# Patient Record
Sex: MALE | Race: WHITE
[De-identification: names, ages, dates, MRNs, and addresses within clinical notes are randomized per-mention and may not be internally consistent; named-entity substitution may affect disease eponyms.]

---

## 2019-09-12 ENCOUNTER — HOSPITAL ENCOUNTER (EMERGENCY)
Dept: HOSPITAL 8 - ED | Age: 34
Discharge: HOME | End: 2019-09-12
Payer: MEDICAID

## 2019-09-12 VITALS — HEIGHT: 72 IN | BODY MASS INDEX: 27.41 KG/M2 | WEIGHT: 202.38 LBS

## 2019-09-12 VITALS — DIASTOLIC BLOOD PRESSURE: 51 MMHG | SYSTOLIC BLOOD PRESSURE: 106 MMHG

## 2019-09-12 DIAGNOSIS — F31.9: ICD-10-CM

## 2019-09-12 DIAGNOSIS — M54.5: Primary | ICD-10-CM

## 2019-09-12 DIAGNOSIS — R11.0: ICD-10-CM

## 2019-09-12 LAB
ANION GAP SERPL CALC-SCNC: 6 MMOL/L (ref 5–15)
BASOPHILS # BLD AUTO: 0.03 X10^3/UL (ref 0–0.1)
BASOPHILS NFR BLD AUTO: 1 % (ref 0–1)
CALCIUM SERPL-MCNC: 8.3 MG/DL (ref 8.5–10.1)
CHLORIDE SERPL-SCNC: 107 MMOL/L (ref 98–107)
CREAT SERPL-MCNC: 1.09 MG/DL (ref 0.7–1.3)
CULTURE INDICATED?: NO
EOSINOPHIL # BLD AUTO: 0.43 X10^3/UL (ref 0–0.4)
EOSINOPHIL NFR BLD AUTO: 6 % (ref 1–7)
ERYTHROCYTE [DISTWIDTH] IN BLOOD BY AUTOMATED COUNT: 14 % (ref 9.4–14.8)
LYMPHOCYTES # BLD AUTO: 2.5 X10^3/UL (ref 1–3.4)
LYMPHOCYTES NFR BLD AUTO: 33 % (ref 22–44)
MCH RBC QN AUTO: 32.3 PG (ref 27.5–34.5)
MCHC RBC AUTO-ENTMCNC: 34.2 G/DL (ref 33.2–36.2)
MCV RBC AUTO: 94.4 FL (ref 81–97)
MD: NO
MICROSCOPIC: (no result)
MONOCYTES # BLD AUTO: 1.03 X10^3/UL (ref 0.2–0.8)
MONOCYTES NFR BLD AUTO: 14 % (ref 2–9)
NEUTROPHILS # BLD AUTO: 3.53 X10^3/UL (ref 1.8–6.8)
NEUTROPHILS NFR BLD AUTO: 47 % (ref 42–75)
PLATELET # BLD AUTO: 229 X10^3/UL (ref 130–400)
PMV BLD AUTO: 8.4 FL (ref 7.4–10.4)
RBC # BLD AUTO: 4.14 X10^6/UL (ref 4.38–5.82)

## 2019-09-12 PROCEDURE — 80048 BASIC METABOLIC PNL TOTAL CA: CPT

## 2019-09-12 PROCEDURE — 81003 URINALYSIS AUTO W/O SCOPE: CPT

## 2019-09-12 PROCEDURE — 36415 COLL VENOUS BLD VENIPUNCTURE: CPT

## 2019-09-12 PROCEDURE — 99283 EMERGENCY DEPT VISIT LOW MDM: CPT

## 2019-09-12 PROCEDURE — 85025 COMPLETE CBC W/AUTO DIFF WBC: CPT

## 2019-09-12 NOTE — NUR
ERP WAS IN FOR RECHECK. D/C INSTRUCTIONS & F/U APPT RV'WD WITH PT, HE 
VERBALIZES UNDERSTANDING. PT AMBULATED OUT OF ED WITHOUT DIFFICULTY.

## 2019-09-12 NOTE — NUR
PT AMBULATED TO BR WITHOUT DIFFICULTY. INSTRUCTED ON CLEAN CATCH URINE SAMPLE. 
MEDICATED FOR PAIN PER ORDERS. RV'WD POC WITH PT.

## 2019-09-26 ENCOUNTER — HOSPITAL ENCOUNTER (EMERGENCY)
Facility: MEDICAL CENTER | Age: 34
End: 2019-09-26
Attending: EMERGENCY MEDICINE
Payer: COMMERCIAL

## 2019-09-26 VITALS
SYSTOLIC BLOOD PRESSURE: 119 MMHG | TEMPERATURE: 97.9 F | HEART RATE: 81 BPM | OXYGEN SATURATION: 93 % | WEIGHT: 212.3 LBS | HEIGHT: 72 IN | RESPIRATION RATE: 14 BRPM | DIASTOLIC BLOOD PRESSURE: 83 MMHG | BODY MASS INDEX: 28.76 KG/M2

## 2019-09-26 DIAGNOSIS — K08.89 TOOTHACHE: ICD-10-CM

## 2019-09-26 PROCEDURE — 99283 EMERGENCY DEPT VISIT LOW MDM: CPT

## 2019-09-26 RX ORDER — IBUPROFEN 600 MG/1
600 TABLET ORAL
Qty: 20 TAB | Refills: 0 | Status: SHIPPED | OUTPATIENT
Start: 2019-09-26 | End: 2020-05-30

## 2019-09-26 RX ORDER — PENICILLIN V POTASSIUM 500 MG/1
500 TABLET ORAL EVERY 6 HOURS
Qty: 28 TAB | Refills: 0 | Status: SHIPPED | OUTPATIENT
Start: 2019-09-26 | End: 2019-10-03

## 2019-09-26 NOTE — DISCHARGE INSTRUCTIONS
Recommend following with dentist using information provided as soon as possible.  You may follow with an oral surgeon, Dr. Medrano, however I would recommend first following with a general dentist.  Return to the ER for increasing pain, swelling, difficulty swallowing or breathing or any turn for the worse.

## 2019-09-26 NOTE — ED PROVIDER NOTES
ED Provider Note    CHIEF COMPLAINT  Chief Complaint   Patient presents with   • Dental Pain       HPI  Hira Noe is a 34 y.o. male who presents complaining of toothache.  It hurts on the bottom right.  It has been hurting for couple months, however it is off and on.  When it is on it tends to last longer and longer now.  It does not hurt at the moment.  He does not have a dentist, and was referred here by the staff in the [facility] where he is staying.  Pain radiates locally.  Rates pain as moderate to severe.  It is worse to chew or bite down.  However, patient is able to take orals.  No associated breathing/swallowing difficulty.  No fever.  No nausea, vomiting, chest pain, shortness of breath, weakness, numbness, bowel or bladder changes. There are no other complaints.    PAST MEDICAL HISTORY  None    FAMILY HISTORY  No family history on file.    SOCIAL HISTORY  He is staying at the [facility]    SURGICAL HISTORY  No past surgical history on file.    CURRENT MEDICATIONS    I have reviewed the nurses notes and/or the list brought with the patient.    ALLERGIES  No Known Allergies    REVIEW OF SYSTEMS  See HPI for further details. Review of systems as above, otherwise all other systems are negative.     PHYSICAL EXAM  VITAL SIGNS: /82   Pulse 81   Temp 36.9 °C (98.4 °F) (Temporal)   Resp 18   Ht 1.829 m (6')   Wt 96.3 kg (212 lb 4.9 oz)   SpO2 97%   BMI 28.79 kg/m²     Constitutional: Well appearing patient in mild distress from pain.  Not toxic, nor ill in appearance.  HENT: Mucus membranes moist.  Oropharynx is clear.  There are scattered caries.  There is no facial edema.  There is no tenderness.  However the tooth of question appears to have a cusp which is decayed, and somewhat discolored; this is his first mandibular molar on the right.  Tongue is normal.  Floor of the mouth is normal.  Submental space is soft.  Posterior pharynx is normal.  Patient is tolerating secretions  without difficulty.  Eyes: Pupils equally round.  No scleral icterus.   Neck: Full nontender range of motion; no meningismus.  Lymphatic: No cervical lymphadenopathy noted.   Cardiovascular: Regular heart rate and rhythm.  No murmurs, rubs, nor gallop appreciated.   Thorax & Lungs: Lungs are clear to auscultation with good air movement bilaterally.  No wheeze, rhonchi, nor rales.   Abdomen: Soft, with no tenderness, rebound nor guarding.  No mass, pulsatile mass, nor hepatosplenomegaly appreciated.  Skin: No purpura nor petechia noted.  Extremities/Musculoskeletal: No sign of trauma.  Musculoskeletal: Range of motion is intact in all major joints.  Neurologic: Alert & oriented.  Strength and sensation is intact all around.  No dysmetria.  Gait is normal.  Psychiatric: Normal affect appropriate for the clinical situation.      MEDICAL RECORD  I have reviewed patient's medical record and pertinent results are listed above.    COURSE & MEDICAL DECISION MAKING  I have reviewed any medical record information, laboratory studies and radiographic results as noted above.  This patient presents with a toothache.  There is no obvious dental abscess at this time which I can drain.  I am prescribing the patient with ibuprofen and antibiotics.  He was given referral for Dr. Medrano and oral surgery, I suspect he would be best served by starting off with general dentistry first.  They are counseled that they may get worse before getting better and to return for increasing pain or swelling, breathing/swallowing difficulty, fever, any other concern at all.  They are given aftercare instructions on toothache, a dental referral sheet, and narcotic cautions.    FINAL IMPRESSION  1. Toothache           This dictation was created using voice recognition software.    Electronically signed by: Chiki Benavides, 9/26/2019 2:09 PM

## 2019-09-26 NOTE — ED NOTES
Pt discharged home. Explained discharge instructions. Questions and comments addressed. Pt verbalized understanding of instructions. Wristband removed. Pt advised to follow-up with dentist asap or return to ED for any new or worsening of symptoms. Pt is ambulating well and steady on feet. VS stable. self will be escorting pt home.     Pt verbalized understanding of medication instructions.

## 2019-10-25 ENCOUNTER — HOSPITAL ENCOUNTER (EMERGENCY)
Dept: HOSPITAL 8 - ED | Age: 34
Discharge: HOME | End: 2019-10-25
Payer: MEDICAID

## 2019-10-25 VITALS — DIASTOLIC BLOOD PRESSURE: 91 MMHG | SYSTOLIC BLOOD PRESSURE: 121 MMHG

## 2019-10-25 VITALS — BODY MASS INDEX: 28.76 KG/M2 | WEIGHT: 212.31 LBS | HEIGHT: 72 IN

## 2019-10-25 DIAGNOSIS — M54.42: Primary | ICD-10-CM

## 2019-10-25 PROCEDURE — 99283 EMERGENCY DEPT VISIT LOW MDM: CPT

## 2019-10-25 PROCEDURE — 72110 X-RAY EXAM L-2 SPINE 4/>VWS: CPT

## 2019-10-25 NOTE — NUR
Patient discharged home, discharge instructions provided. All questions and 
concerns addressed. Ambulatory with steady gait. All patient belongings with 
patient.
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
## 2019-11-25 ENCOUNTER — HOSPITAL ENCOUNTER (EMERGENCY)
Dept: HOSPITAL 8 - ED | Age: 34
Discharge: HOME | End: 2019-11-25
Payer: MEDICAID

## 2019-11-25 VITALS — HEIGHT: 72 IN | BODY MASS INDEX: 29.56 KG/M2 | WEIGHT: 218.26 LBS

## 2019-11-25 VITALS — DIASTOLIC BLOOD PRESSURE: 93 MMHG | SYSTOLIC BLOOD PRESSURE: 153 MMHG

## 2019-11-25 DIAGNOSIS — K02.9: Primary | ICD-10-CM

## 2019-11-25 DIAGNOSIS — J02.9: ICD-10-CM

## 2019-11-25 DIAGNOSIS — K08.89: ICD-10-CM

## 2019-11-25 PROCEDURE — 87081 CULTURE SCREEN ONLY: CPT

## 2019-11-25 PROCEDURE — 87880 STREP A ASSAY W/OPTIC: CPT

## 2019-11-25 PROCEDURE — 99283 EMERGENCY DEPT VISIT LOW MDM: CPT

## 2019-12-01 ENCOUNTER — HOSPITAL ENCOUNTER (EMERGENCY)
Dept: HOSPITAL 8 - ED | Age: 34
End: 2019-12-01
Payer: MEDICAID

## 2019-12-01 VITALS — HEIGHT: 72 IN | BODY MASS INDEX: 28.91 KG/M2 | WEIGHT: 213.41 LBS

## 2019-12-01 VITALS — DIASTOLIC BLOOD PRESSURE: 89 MMHG | SYSTOLIC BLOOD PRESSURE: 132 MMHG

## 2019-12-01 DIAGNOSIS — K08.89: Primary | ICD-10-CM

## 2019-12-01 DIAGNOSIS — Z87.891: ICD-10-CM

## 2019-12-01 PROCEDURE — 99281 EMR DPT VST MAYX REQ PHY/QHP: CPT

## 2019-12-16 ENCOUNTER — HOSPITAL ENCOUNTER (EMERGENCY)
Dept: HOSPITAL 8 - ED | Age: 34
Discharge: HOME | End: 2019-12-16
Payer: MEDICAID

## 2019-12-16 VITALS — BODY MASS INDEX: 28.1 KG/M2 | WEIGHT: 207.45 LBS | HEIGHT: 72 IN

## 2019-12-16 VITALS — SYSTOLIC BLOOD PRESSURE: 117 MMHG | DIASTOLIC BLOOD PRESSURE: 83 MMHG

## 2019-12-16 DIAGNOSIS — F17.210: ICD-10-CM

## 2019-12-16 DIAGNOSIS — K02.9: Primary | ICD-10-CM

## 2019-12-16 PROCEDURE — 99283 EMERGENCY DEPT VISIT LOW MDM: CPT

## 2019-12-23 ENCOUNTER — HOSPITAL ENCOUNTER (EMERGENCY)
Dept: HOSPITAL 8 - ED | Age: 34
Discharge: HOME | End: 2019-12-23
Payer: MEDICAID

## 2019-12-23 VITALS — BODY MASS INDEX: 28.85 KG/M2 | WEIGHT: 212.97 LBS | HEIGHT: 72 IN

## 2019-12-23 VITALS — SYSTOLIC BLOOD PRESSURE: 136 MMHG | DIASTOLIC BLOOD PRESSURE: 92 MMHG

## 2019-12-23 DIAGNOSIS — K08.89: Primary | ICD-10-CM

## 2019-12-23 PROCEDURE — 99283 EMERGENCY DEPT VISIT LOW MDM: CPT

## 2019-12-23 NOTE — NUR
Patient/Caregiver given discharge instructions and they have confirmed that 
they understand the instructions.  Patient ambulatory with steady gait. PT  
DECLINED [facility] STATING HE IS IN THE [facility] DRUG REHAB PROGRAM.  
[facility] DELETED FROM SCRIPT

## 2020-05-30 ENCOUNTER — HOSPITAL ENCOUNTER (EMERGENCY)
Facility: MEDICAL CENTER | Age: 35
End: 2020-05-30
Attending: EMERGENCY MEDICINE
Payer: COMMERCIAL

## 2020-05-30 VITALS
TEMPERATURE: 98.7 F | RESPIRATION RATE: 16 BRPM | OXYGEN SATURATION: 95 % | WEIGHT: 229.28 LBS | BODY MASS INDEX: 31.05 KG/M2 | SYSTOLIC BLOOD PRESSURE: 123 MMHG | HEART RATE: 75 BPM | HEIGHT: 72 IN | DIASTOLIC BLOOD PRESSURE: 80 MMHG

## 2020-05-30 DIAGNOSIS — K02.9 PAIN DUE TO DENTAL CARIES: ICD-10-CM

## 2020-05-30 PROCEDURE — A9270 NON-COVERED ITEM OR SERVICE: HCPCS | Mod: EDC | Performed by: EMERGENCY MEDICINE

## 2020-05-30 PROCEDURE — 99283 EMERGENCY DEPT VISIT LOW MDM: CPT | Mod: EDC

## 2020-05-30 PROCEDURE — 700102 HCHG RX REV CODE 250 W/ 637 OVERRIDE(OP): Mod: EDC | Performed by: EMERGENCY MEDICINE

## 2020-05-30 RX ORDER — AMOXICILLIN 500 MG/1
500 CAPSULE ORAL 3 TIMES DAILY
Qty: 30 CAP | Refills: 0 | Status: SHIPPED | OUTPATIENT
Start: 2020-05-30

## 2020-05-30 RX ORDER — OXYCODONE HYDROCHLORIDE AND ACETAMINOPHEN 5; 325 MG/1; MG/1
1 TABLET ORAL EVERY 4 HOURS PRN
Status: DISCONTINUED | OUTPATIENT
Start: 2020-05-30 | End: 2020-05-30 | Stop reason: HOSPADM

## 2020-05-30 RX ORDER — IBUPROFEN 600 MG/1
600 TABLET ORAL EVERY 6 HOURS PRN
Qty: 30 TAB | Refills: 0 | Status: SHIPPED | OUTPATIENT
Start: 2020-05-30

## 2020-05-30 RX ADMIN — OXYCODONE HYDROCHLORIDE AND ACETAMINOPHEN 1 TABLET: 5; 325 TABLET ORAL at 08:10

## 2020-05-30 NOTE — ED NOTES
Pt ambulatory to Peds 40. Agree with triage RN note. Instructed to change into gown. Pt alert, pink, interactive and in NAD. Reports L lower dental pain x 1 month, worsening over the past 24 hours. States he has been unable to make dental appointment and was unable to be seen at the walk in clinic. Denies fever, cough, congestion or vomiting. Call light within reach. Denies additional needs. Up for ERP eval.

## 2020-05-30 NOTE — ED NOTES
Reports he is getting a ride home from [facility], pt aware that the medication he is receiving contains a narcotic, verbalizes understanding and agreement. Medicated per MAR.

## 2020-05-30 NOTE — ED NOTES
Discharge teaching for dental caries provided to patient. Reviewed home care, importance of hydration and when to return to ED with worsening symptoms. Rx for amoxicillin and motrin sent to preferred pharmacy, instruction provided. Instructed on completing full course of antibiotics. Instructed on importance of follow up care with   Unfortunately, we do not have a a dental clinic for referral.  The [facility] clinic is the best option for cash pay or uninsured.        Dental referral sheet provided. All questions answered, patient verbalizes understanding to all teaching. Copy of discharge paperwork provided. Signed copy in chart. Armband removed. Pt alert, pink, interactive and in NAD. Ambulatory out of department in stable condition.

## 2020-05-30 NOTE — ED PROVIDER NOTES
ED Provider Note    CHIEF COMPLAINT  Chief Complaint   Patient presents with   • Dental Pain     x1 day, L Lower     Seen at 7:49 AM    HPI  Hira Noe is a 34 y.o. male who presents with few days of left-sided dental pain.  The patient is been taking ibuprofen with no significant improvement.  He denies any fevers or chills.  He has not had a dentist.  The patient has been going to the [facility] clinic to attempt to get a dental appointment but unfortunately they have been full and there has not been any cancellations.  He denies any allergies.    REVIEW OF SYSTEMS  See HPI  PAST MEDICAL HISTORY       SOCIAL HISTORY  Social History     Tobacco Use   • Smoking status: Never Smoker   • Smokeless tobacco: Never Used   Substance and Sexual Activity   • Alcohol use: Not Currently   • Drug use: Not Currently   • Sexual activity: Not on file       SURGICAL HISTORY  patient denies any surgical history    CURRENT MEDICATIONS  Reviewed.  See Encounter Summary.     ALLERGIES  No Known Allergies    PHYSICAL EXAM  VITAL SIGNS: /85   Pulse 81   Temp 36.7 °C (98 °F) (Temporal)   Resp 16   Ht 1.829 m (6')   Wt 104 kg (229 lb 4.5 oz)   SpO2 94%   BMI 31.10 kg/m²   Constitutional: Awake, alert in no apparent distress.  HENT: Normocephalic, Bilateral external ears normal. Nose normal.  Trismus.  Normal speech.  The floor the oropharynx is normal.  There is neck is supple without any lymphadenopathy.  There are 2 dental caries observed on #17 #19.  The patient has no periapical swelling or tenderness.  Eyes: Conjunctiva normal, non-icteric, EOMI.    Thorax & Lungs: Easy unlabored respirations  Cardiovascular:    Abdomen:  No distention  Skin: Visualized skin is  Dry, No erythema, No rash.   Extremities:   atraumatic   Neurologic: Alert, Grossly non-focal.   Psychiatric: Affect and Mood normal    RADIOLOGY  No orders to display       Nursing notes and vital signs were reviewed. (See chart for details)    Decision  Making:  This is a 34 y.o. year old male who presents with pain to dental caries.  There is no sign of deep space abscess.  The patient was given 1 dose of Percocet in the emergency department he will be discharged with anti-inflammatories and amoxicillin.  He does not have any insurance, he does not have sufficient funds to do cash pay for standard dentistry, therefore the [facility] is his best option.  He does not require oral surgery is the caries are amenable to standard dentistry.    DISPOSITION:  Patient will be discharged home in good condition.    Discharge Medications:  New Prescriptions    AMOXICILLIN (AMOXIL) 500 MG CAP    Take 1 Cap by mouth 3 times a day.    IBUPROFEN (MOTRIN) 600 MG TAB    Take 1 Tab by mouth every 6 hours as needed.       The patient was discharged home (see d/c instructions) and told to return immediately for any signs or symptoms listed, or any worsening at all.  The patient verbally agreed to the discharge precautions and follow-up plan which is documented in EPIC.    The patient's blood pressure is elevated today. >120/80. I have referred them to primary care for follow up.       FINAL IMPRESSION  1. Pain due to dental caries

## 2020-05-30 NOTE — ED TRIAGE NOTES
Hira Kusum presents to triage reporting   Chief Complaint   Patient presents with   • Dental Pain     x1 day, L Lower     Pt states he took ibuprofen w/o relief.

## 2021-01-20 DIAGNOSIS — R07.89 OTHER CHEST PAIN: Primary | ICD-10-CM

## 2021-01-20 LAB — EKG IMPRESSION: NORMAL

## 2021-03-01 NOTE — NUR
LVM on patients cell to call office to confirm appt for 3/3/21 and complete covid screening TASK RN: BEDSIDE REPORT TO HUNTER METZGER.